# Patient Record
Sex: FEMALE | Race: WHITE | NOT HISPANIC OR LATINO | Employment: STUDENT | URBAN - METROPOLITAN AREA
[De-identification: names, ages, dates, MRNs, and addresses within clinical notes are randomized per-mention and may not be internally consistent; named-entity substitution may affect disease eponyms.]

---

## 2023-05-02 ENCOUNTER — HOSPITAL ENCOUNTER (EMERGENCY)
Facility: CLINIC | Age: 23
Discharge: HOME | End: 2023-05-03
Attending: EMERGENCY MEDICINE | Admitting: EMERGENCY MEDICINE
Payer: COMMERCIAL

## 2023-05-02 ENCOUNTER — APPOINTMENT (OUTPATIENT)
Dept: GENERAL RADIOLOGY | Facility: CLINIC | Age: 23
End: 2023-05-02
Attending: EMERGENCY MEDICINE
Payer: COMMERCIAL

## 2023-05-02 ENCOUNTER — NURSE TRIAGE (OUTPATIENT)
Dept: NURSING | Facility: CLINIC | Age: 23
End: 2023-05-02

## 2023-05-02 DIAGNOSIS — R07.9 CHEST PAIN, UNSPECIFIED TYPE: ICD-10-CM

## 2023-05-02 LAB
ANION GAP SERPL CALCULATED.3IONS-SCNC: 14 MMOL/L (ref 7–15)
ATRIAL RATE - MUSE: 65 BPM
BASOPHILS # BLD AUTO: 0.1 10E3/UL (ref 0–0.2)
BASOPHILS NFR BLD AUTO: 1 %
BUN SERPL-MCNC: 17.8 MG/DL (ref 6–20)
CALCIUM SERPL-MCNC: 10 MG/DL (ref 8.6–10)
CHLORIDE SERPL-SCNC: 103 MMOL/L (ref 98–107)
CREAT SERPL-MCNC: 0.88 MG/DL (ref 0.51–0.95)
D DIMER PPP FEU-MCNC: <0.27 UG/ML FEU (ref 0–0.5)
DEPRECATED HCO3 PLAS-SCNC: 23 MMOL/L (ref 22–29)
DIASTOLIC BLOOD PRESSURE - MUSE: NORMAL MMHG
EOSINOPHIL # BLD AUTO: 0.1 10E3/UL (ref 0–0.7)
EOSINOPHIL NFR BLD AUTO: 0 %
ERYTHROCYTE [DISTWIDTH] IN BLOOD BY AUTOMATED COUNT: 13.6 % (ref 10–15)
GFR SERPL CREATININE-BSD FRML MDRD: >90 ML/MIN/1.73M2
GLUCOSE SERPL-MCNC: 100 MG/DL (ref 70–99)
HCG SERPL QL: NEGATIVE
HCT VFR BLD AUTO: 39.8 % (ref 35–47)
HGB BLD-MCNC: 13.2 G/DL (ref 11.7–15.7)
IMM GRANULOCYTES # BLD: 0.1 10E3/UL
IMM GRANULOCYTES NFR BLD: 0 %
INTERPRETATION ECG - MUSE: NORMAL
LYMPHOCYTES # BLD AUTO: 4.6 10E3/UL (ref 0.8–5.3)
LYMPHOCYTES NFR BLD AUTO: 34 %
MAGNESIUM SERPL-MCNC: 2 MG/DL (ref 1.7–2.3)
MCH RBC QN AUTO: 27.8 PG (ref 26.5–33)
MCHC RBC AUTO-ENTMCNC: 33.2 G/DL (ref 31.5–36.5)
MCV RBC AUTO: 84 FL (ref 78–100)
MONOCYTES # BLD AUTO: 1 10E3/UL (ref 0–1.3)
MONOCYTES NFR BLD AUTO: 7 %
NEUTROPHILS # BLD AUTO: 7.6 10E3/UL (ref 1.6–8.3)
NEUTROPHILS NFR BLD AUTO: 58 %
NRBC # BLD AUTO: 0 10E3/UL
NRBC BLD AUTO-RTO: 0 /100
P AXIS - MUSE: 51 DEGREES
PLATELET # BLD AUTO: 272 10E3/UL (ref 150–450)
POTASSIUM SERPL-SCNC: 3.7 MMOL/L (ref 3.4–5.3)
PR INTERVAL - MUSE: 112 MS
QRS DURATION - MUSE: 88 MS
QT - MUSE: 416 MS
QTC - MUSE: 432 MS
R AXIS - MUSE: 78 DEGREES
RBC # BLD AUTO: 4.75 10E6/UL (ref 3.8–5.2)
SODIUM SERPL-SCNC: 140 MMOL/L (ref 136–145)
SYSTOLIC BLOOD PRESSURE - MUSE: NORMAL MMHG
T AXIS - MUSE: 21 DEGREES
TROPONIN T SERPL HS-MCNC: <6 NG/L
VENTRICULAR RATE- MUSE: 65 BPM
WBC # BLD AUTO: 13.4 10E3/UL (ref 4–11)

## 2023-05-02 PROCEDURE — 99285 EMERGENCY DEPT VISIT HI MDM: CPT | Mod: 25 | Performed by: EMERGENCY MEDICINE

## 2023-05-02 PROCEDURE — 85379 FIBRIN DEGRADATION QUANT: CPT | Performed by: EMERGENCY MEDICINE

## 2023-05-02 PROCEDURE — 85004 AUTOMATED DIFF WBC COUNT: CPT | Performed by: EMERGENCY MEDICINE

## 2023-05-02 PROCEDURE — 84484 ASSAY OF TROPONIN QUANT: CPT | Performed by: EMERGENCY MEDICINE

## 2023-05-02 PROCEDURE — 71046 X-RAY EXAM CHEST 2 VIEWS: CPT | Mod: 26 | Performed by: RADIOLOGY

## 2023-05-02 PROCEDURE — 84703 CHORIONIC GONADOTROPIN ASSAY: CPT | Performed by: EMERGENCY MEDICINE

## 2023-05-02 PROCEDURE — 250N000013 HC RX MED GY IP 250 OP 250 PS 637: Performed by: EMERGENCY MEDICINE

## 2023-05-02 PROCEDURE — 71046 X-RAY EXAM CHEST 2 VIEWS: CPT

## 2023-05-02 PROCEDURE — 36415 COLL VENOUS BLD VENIPUNCTURE: CPT | Performed by: EMERGENCY MEDICINE

## 2023-05-02 PROCEDURE — 93010 ELECTROCARDIOGRAM REPORT: CPT | Mod: 59 | Performed by: EMERGENCY MEDICINE

## 2023-05-02 PROCEDURE — 93005 ELECTROCARDIOGRAM TRACING: CPT | Performed by: EMERGENCY MEDICINE

## 2023-05-02 PROCEDURE — 80048 BASIC METABOLIC PNL TOTAL CA: CPT | Performed by: EMERGENCY MEDICINE

## 2023-05-02 PROCEDURE — 83735 ASSAY OF MAGNESIUM: CPT | Performed by: EMERGENCY MEDICINE

## 2023-05-02 PROCEDURE — 93308 TTE F-UP OR LMTD: CPT | Mod: 26 | Performed by: EMERGENCY MEDICINE

## 2023-05-02 PROCEDURE — 93308 TTE F-UP OR LMTD: CPT | Performed by: EMERGENCY MEDICINE

## 2023-05-02 RX ORDER — ACETAMINOPHEN 325 MG/1
975 TABLET ORAL ONCE
Status: COMPLETED | OUTPATIENT
Start: 2023-05-02 | End: 2023-05-02

## 2023-05-02 RX ADMIN — ACETAMINOPHEN 975 MG: 325 TABLET, FILM COATED ORAL at 22:50

## 2023-05-02 ASSESSMENT — ACTIVITIES OF DAILY LIVING (ADL): ADLS_ACUITY_SCORE: 35

## 2023-05-03 VITALS
HEART RATE: 67 BPM | RESPIRATION RATE: 16 BRPM | SYSTOLIC BLOOD PRESSURE: 105 MMHG | WEIGHT: 150 LBS | HEIGHT: 70 IN | TEMPERATURE: 98.2 F | DIASTOLIC BLOOD PRESSURE: 67 MMHG | OXYGEN SATURATION: 99 % | BODY MASS INDEX: 21.47 KG/M2

## 2023-05-03 NOTE — ED TRIAGE NOTES
22 yr old female arrives via walk in w/ complaints of left sided chest pain that worsens with exertion x 1 day. Pmhx of an irregular heartbeat. Pt did run a half marathon this weekend as well. Pt denies that the pain radiates.

## 2023-05-03 NOTE — ED PROVIDER NOTES
"    [place] EMERGENCY DEPARTMENT ([facility])    5/02/23       ED PROVIDER NOTE      History     Chief Complaint   Patient presents with     Chest Pain     HPI  Natasha Hernandez is a 22 year old female with a past medical history significant for major depressive disorder and generalized anxiety disorder (not on current medication) who presents to the ED for evaluation of left-sided chest pain.  Patient states that earlier today around noon, she was walking around campus when she began to experience a metallic taste in her mouth, left-sided chest pain, and dizziness.  She states that she feels easily exerted today.  Patient reports that her symptoms have been coming and going.  Patient did run a half marathon on Sunday 4/30, but states that she felt typical afterwards.  She denies cough, shortness of breath, pain with deep breathing, or pain with position changes.  No nausea, vomiting, fever, or diarrhea.  No pain or swelling in lower extremities, history of blood clots, or family history of blood clots.  Patient denies use of birth control pill or implant, smoking, or vaping.  Denies abdominal pain.  No changes in bowel or urinary habits.  Denies other past medical history.    Past Medical History  No past medical history on file.  No past surgical history on file.  No current outpatient medications on file.    No Known Allergies  Family History  No family history on file.  Social History       Past medical history, past surgical history, medications, allergies, family history, and social history were reviewed with the patient. No additional pertinent items.        Physical Exam   BP: (!) 151/85  Pulse: 62  Temp: 98.2  F (36.8  C)  Resp: 16  Height: 177.8 cm (5' 10\")  Weight: 68 kg (150 lb)  SpO2: 97 %  Physical Exam  General: patient is alert and oriented and in no acute distress   Head: atraumatic and normocephalic   EENT: moist mucus membranes without tonsillar erythema or exudates, sclera anicteric   neck: " supple   Cardiovascular: regular rate and rhythm, no murmur appreciated, extremities warm and well perfused, no lower extremity edema, 2+ radial and PT pulses bilaterally  Pulmonary: lungs clear to auscultation bilaterally   Abdomen: soft, non-tender   Musculoskeletal: normal range of motion   Neurological: alert and oriented, moving all extremities symmetrically, gait normal   Skin: warm, dry       ED Course, Procedures, & Data     10:15 PM  The patient was seen and examined by Brea Anton   in Room ED18.     Procedures       ED Course Selections:        EKG Interpretation:      Interpreted by Brea Anton MD  Time reviewed: 2211  Symptoms at time of EKG: None   Rhythm: normal sinus   Rate: Normal  Axis: Normal  Ectopy: none  Conduction: normal  ST Segments/ T Waves: T wave inversion III  Q Waves: none  Comparison to prior: No old EKG available    Clinical Impression: non-specific EKG, question possible delta wave in inferior leads and WPW              No results found for any visits on 05/02/23.  Medications - No data to display  Labs Ordered and Resulted from Time of ED Arrival to Time of ED Departure - No data to display  XR Chest 2 Views    (Results Pending)          Critical care was not performed.     Medical Decision Making  The patient's presentation was of high complexity (an acute health issue posing potential threat to life or bodily function).    The patient's evaluation involved:  ordering and/or review of 3+ test(s) in this encounter (see separate area of note for details)  independent interpretation of testing performed by another health professional (see separate area of note for details)    The patient's management necessitated high risk (a decision regarding hospitalization).      Assessment & Plan    Ms. Hernandez is a 22 year old female with a past medical history significant for major depressive disorder and generalized anxiety disorder (not on current medication) who presents to  the ED for evaluation of left-sided chest pain.  She is initially noted to be moderately hypertensive her upper and recheck blood pressure is within normal limits.  She is otherwise hemodynamically stable, afebrile and in no respiratory distress.  Her ECG shows no acute ischemic changes.  Troponin is less than 6.  Low suspicion for ACS. Reviewed EKG with cardiology for possible delta wave.  Recommended a follow-up in cardiology clinic for further evaluation.  Chest x-ray shows no evidence of pneumonia, pneumothorax, pleural effusion or widening of the mediastinum.  Low suspicion for PE and D-dimer is within normal limits.  Also have a low suspicion for aortic dissection given history, normal chest x-ray and D-dimer.  Bedside cardiac ultrasound shows grossly normal function without pericardial effusion.  She has no abdominal tenderness on exam.  Discussed results with patient and recommended that she follow-up with cardiology.  She was given close return precautions for the emergency department and voiced understanding.    I have reviewed the nursing notes. I have reviewed the findings, diagnosis, plan and need for follow up with the patient.    There are no discharge medications for this patient.      Final diagnoses:   Chest pain, unspecified type   I, Kellen Blanchard, am serving as a trained medical scribe to document services personally performed by Brea Mendieta MD, based on the provider's statements to me.     I, Brea Mendieta MD, was physically present and have reviewed and verified the accuracy of this note documented by Kellen Blanchard.      Brea Mendieta MD  [facility] EMERGENCY DEPARTMENT  5/2/2023          Brea Mendieta MD  05/03/23 0143

## 2023-05-03 NOTE — TELEPHONE ENCOUNTER
"Natasha reports she's been having \"weird Chest Pain\" of her Lt chest.  She had symptoms this morning and again tonight.    She describes it's more like a muscle cramp than pain - \"like my Pecs\"  - Currently feeling since ~8 pm, (>1h)  - Ran 1/2 marathon on Sun, 4/30/23    This morning  - Started with metallic taste - this morning ~10am  - Then felt lightheaded  - Then Lt chest pain when walking on campus    Tonight when walking on campus, she felt like it was harder to breathe  While at work tonight, she felt very fatigued and her arms felt weak.    She was told she had an Irregular Heart beat a few years ago when donating plasma.  It was not identified and she has not been seen or treated for it.    ER advised    Sharla Wheeler RN  [facility]      Reason for Disposition    [1] Chest pain lasts > 5 minutes AND [2] occurred in past 3 days (72 hours) (Exception: feels exactly the same as previously diagnosed heartburn and has accompanying sour taste in mouth)    Additional Information    Negative: SEVERE difficulty breathing (e.g., struggling for each breath, speaks in single words)    Negative: Difficult to awaken or acting confused (e.g., disoriented, slurred speech)    Negative: Shock suspected (e.g., cold/pale/clammy skin, too weak to stand, low BP, rapid pulse)    Negative: Passed out (i.e., lost consciousness, collapsed and was not responding)    Negative: [1] Chest pain lasts > 5 minutes AND [2] age > 44    Negative: [1] Chest pain lasts > 5 minutes AND [2] age > 30 AND [3] one or more cardiac risk factors (e.g., diabetes, high blood pressure, high cholesterol, smoker, or strong family history of heart disease)    Negative: [1] Chest pain lasts > 5 minutes AND [2] history of heart disease (i.e., angina, heart attack, heart failure, bypass surgery, takes nitroglycerin)    Negative: [1] Chest pain lasts > 5 minutes AND [2] described as crushing, pressure-like, or heavy    Negative: Heart " "beating < 50 beats per minute OR > 140 beats per minute    Negative: Visible sweat on face or sweat dripping down face    Negative: Sounds like a life-threatening emergency to the triager    Negative: Followed a chest injury    Negative: SEVERE chest pain    Negative: [1] Chest pain (or \"angina\") comes and goes AND [2] is happening more often (increasing in frequency) or getting worse (increasing in severity) (Exception: chest pains that last only a few seconds)    Negative: Pain also in shoulder(s) or arm(s) or jaw (Exception: pain is clearly made worse by movement)    Negative: Difficulty breathing    Negative: Dizziness or lightheadedness    Negative: Coughing up blood    Negative: Cocaine use within last 3 days    Negative: Major surgery in past month    Negative: Hip or leg fracture (broken bone) in past month (or had cast on leg or ankle in past month)    Negative: Illness requiring prolonged bedrest in past month (e.g., immobilization, long hospital stay)    Negative: Long-distance travel in past month (e.g., car, bus, train, plane; with trip lasting 6 or more hours)    Negative: History of prior \"blood clot\" in leg or lungs (i.e., deep vein thrombosis, pulmonary embolism)    Negative: History of inherited increased risk of blood clots (e.g., Factor 5 Leiden, Anti-thrombin 3, Protein C or Protein S deficiency, Prothrombin mutation)    Negative: Cancer treatment in past six months (or has cancer now)    Protocols used: CHEST PAIN-A-AH      "

## 2023-05-04 NOTE — TELEPHONE ENCOUNTER
RECORDS RECEIVED FROM:    DATE RECEIVED:    NOTES STATUS DETAILS   OFFICE NOTE from referring provider  Internal ED f/u   OFFICE NOTE from other cardiologists  N/A    RECORDS from hospital/ED Internal 5-2-23   MEDICATION LIST Internal    GENERAL CARDIO RECORDS   (ALL APPOINTMENT TYPES)     LABS (CBC,BMP,CMP, TSH) Internal    EKG (STRIPS & REPORTS) Internal 5-2-23   MONITORS (STRIPS & REPORTS) N/A    ECHOS (IMAGES AND REPORTS) N/A    STRESS TESTS (IMAGES AND REPORTS) N/A    cMRI (IMAGES AND REPORTS) N/A    CT/CTA (IMAGES AND REPORTS) N/A

## 2023-05-05 ENCOUNTER — APPOINTMENT (OUTPATIENT)
Dept: CARDIOLOGY | Facility: CLINIC | Age: 23
End: 2023-05-05
Attending: EMERGENCY MEDICINE
Payer: COMMERCIAL

## 2023-05-05 ENCOUNTER — HOSPITAL ENCOUNTER (EMERGENCY)
Facility: CLINIC | Age: 23
Discharge: HOME | End: 2023-05-05
Attending: EMERGENCY MEDICINE | Admitting: EMERGENCY MEDICINE
Payer: COMMERCIAL

## 2023-05-05 VITALS
OXYGEN SATURATION: 100 % | BODY MASS INDEX: 21.47 KG/M2 | SYSTOLIC BLOOD PRESSURE: 114 MMHG | HEART RATE: 56 BPM | WEIGHT: 150 LBS | TEMPERATURE: 98.5 F | RESPIRATION RATE: 18 BRPM | HEIGHT: 70 IN | DIASTOLIC BLOOD PRESSURE: 75 MMHG

## 2023-05-05 DIAGNOSIS — R07.89 CHEST DISCOMFORT: ICD-10-CM

## 2023-05-05 DIAGNOSIS — R00.2 PALPITATIONS: ICD-10-CM

## 2023-05-05 LAB
ANION GAP SERPL CALCULATED.3IONS-SCNC: 9 MMOL/L (ref 7–15)
ATRIAL RATE - MUSE: 48 BPM
BASOPHILS # BLD AUTO: 0.1 10E3/UL (ref 0–0.2)
BASOPHILS NFR BLD AUTO: 1 %
BUN SERPL-MCNC: 20 MG/DL (ref 6–20)
CALCIUM SERPL-MCNC: 9.5 MG/DL (ref 8.6–10)
CHLORIDE SERPL-SCNC: 106 MMOL/L (ref 98–107)
CREAT SERPL-MCNC: 0.73 MG/DL (ref 0.51–0.95)
DEPRECATED HCO3 PLAS-SCNC: 23 MMOL/L (ref 22–29)
DIASTOLIC BLOOD PRESSURE - MUSE: NORMAL MMHG
EOSINOPHIL # BLD AUTO: 0.1 10E3/UL (ref 0–0.7)
EOSINOPHIL NFR BLD AUTO: 1 %
ERYTHROCYTE [DISTWIDTH] IN BLOOD BY AUTOMATED COUNT: 13.5 % (ref 10–15)
GFR SERPL CREATININE-BSD FRML MDRD: >90 ML/MIN/1.73M2
GLUCOSE SERPL-MCNC: 92 MG/DL (ref 70–99)
HCT VFR BLD AUTO: 40.8 % (ref 35–47)
HGB BLD-MCNC: 13.2 G/DL (ref 11.7–15.7)
IMM GRANULOCYTES # BLD: 0 10E3/UL
IMM GRANULOCYTES NFR BLD: 0 %
INTERPRETATION ECG - MUSE: NORMAL
LYMPHOCYTES # BLD AUTO: 4.7 10E3/UL (ref 0.8–5.3)
LYMPHOCYTES NFR BLD AUTO: 39 %
MAGNESIUM SERPL-MCNC: 2 MG/DL (ref 1.7–2.3)
MCH RBC QN AUTO: 27.7 PG (ref 26.5–33)
MCHC RBC AUTO-ENTMCNC: 32.4 G/DL (ref 31.5–36.5)
MCV RBC AUTO: 86 FL (ref 78–100)
MONOCYTES # BLD AUTO: 0.9 10E3/UL (ref 0–1.3)
MONOCYTES NFR BLD AUTO: 8 %
NEUTROPHILS # BLD AUTO: 6.3 10E3/UL (ref 1.6–8.3)
NEUTROPHILS NFR BLD AUTO: 51 %
NRBC # BLD AUTO: 0 10E3/UL
NRBC BLD AUTO-RTO: 0 /100
P AXIS - MUSE: 19 DEGREES
PLATELET # BLD AUTO: 261 10E3/UL (ref 150–450)
POTASSIUM SERPL-SCNC: 3.7 MMOL/L (ref 3.4–5.3)
PR INTERVAL - MUSE: 134 MS
QRS DURATION - MUSE: 98 MS
QT - MUSE: 434 MS
QTC - MUSE: 387 MS
R AXIS - MUSE: 74 DEGREES
RBC # BLD AUTO: 4.77 10E6/UL (ref 3.8–5.2)
SODIUM SERPL-SCNC: 138 MMOL/L (ref 136–145)
SYSTOLIC BLOOD PRESSURE - MUSE: NORMAL MMHG
T AXIS - MUSE: 7 DEGREES
TROPONIN T SERPL HS-MCNC: <6 NG/L
VENTRICULAR RATE- MUSE: 48 BPM
WBC # BLD AUTO: 12.1 10E3/UL (ref 4–11)

## 2023-05-05 PROCEDURE — 99284 EMERGENCY DEPT VISIT MOD MDM: CPT | Mod: 25 | Performed by: EMERGENCY MEDICINE

## 2023-05-05 PROCEDURE — 80048 BASIC METABOLIC PNL TOTAL CA: CPT | Performed by: EMERGENCY MEDICINE

## 2023-05-05 PROCEDURE — 36415 COLL VENOUS BLD VENIPUNCTURE: CPT | Performed by: EMERGENCY MEDICINE

## 2023-05-05 PROCEDURE — 93244 EXT ECG>48HR<7D REV&INTERPJ: CPT | Performed by: INTERNAL MEDICINE

## 2023-05-05 PROCEDURE — 85025 COMPLETE CBC W/AUTO DIFF WBC: CPT | Performed by: EMERGENCY MEDICINE

## 2023-05-05 PROCEDURE — 83735 ASSAY OF MAGNESIUM: CPT | Performed by: EMERGENCY MEDICINE

## 2023-05-05 PROCEDURE — 93010 ELECTROCARDIOGRAM REPORT: CPT | Performed by: EMERGENCY MEDICINE

## 2023-05-05 PROCEDURE — 93005 ELECTROCARDIOGRAM TRACING: CPT | Performed by: EMERGENCY MEDICINE

## 2023-05-05 PROCEDURE — 99284 EMERGENCY DEPT VISIT MOD MDM: CPT | Performed by: EMERGENCY MEDICINE

## 2023-05-05 PROCEDURE — 84484 ASSAY OF TROPONIN QUANT: CPT | Performed by: EMERGENCY MEDICINE

## 2023-05-05 PROCEDURE — 93242 EXT ECG>48HR<7D RECORDING: CPT

## 2023-05-05 ASSESSMENT — ACTIVITIES OF DAILY LIVING (ADL): ADLS_ACUITY_SCORE: 35

## 2023-05-05 NOTE — DISCHARGE INSTRUCTIONS
Follow up later today, as planned. Return the ziopatch as directed. Return with any worsening or concerns.

## 2023-05-05 NOTE — ED PROVIDER NOTES
"      [place] EMERGENCY DEPARTMENT ([facility])  May 5, 2023     History     Chief Complaint   Patient presents with     Palpitations     HPI  Natasha Hernandez is a 22 year old female with a past medical history which includes anxiety and depression who presents to the Emergency Department for evaluation of left-sided chest pressure and palpitations.  She was seen here a few days ago, states she has had left-sided chest pressure most of the time since that visit.  She states that at times she feels short of breath as well, and at times she is felt palpitations-which she describes as, \"my heart beating out of my chest.\"  No fever, cough, abdominal pain, nausea, vomiting, diarrhea, diaphoresis.  She denies any previous history of DVT or PE, any recent travel, surgery, bedrest.  No lower extremity pain or swelling.  She was seen here on 5/2/23, and had a negative evaluation with the exception of an EKG with a questionable delta wave.  This was discussed with cardiology while in the ED, they recommended outpatient follow-up with cardiology clinic.  She reports that she has gotten visit with primary care tomorrow, and believes that she will be able to schedule for echo after that.  Per chart review, it appears that she is given a cardiology appointment scheduled for 5/18/2023.  She denies any substance use including alcohol, drug use.  She denies any stimulant usage recently such as Sudafed.  She states that she drinks less than 1 cup of coffee a day.  She does admit to some anxiety now that her heart is racing, but states that she did not feel that she was having anxiety when it started to race.            Past Medical History  No past medical history on file.  No past surgical history on file.  No current outpatient medications on file.    No Known Allergies  Family History  No family history on file.  Social History       Past medical history, past surgical history, medications, allergies, family history, and " "social history were reviewed with the patient. No additional pertinent items.      A medically appropriate review of systems was performed with pertinent positives and negatives noted in the HPI, and all other systems negative.    Physical Exam   BP: (!) 142/87  Pulse: 60  Temp: 98.5  F (36.9  C)  Resp: 17  Height: 177.8 cm (5' 10\")  Weight: 68 kg (150 lb)  SpO2: 100 %  Physical Exam  Constitutional:       General: She is not in acute distress.     Appearance: Normal appearance. She is not toxic-appearing.   HENT:      Head: Atraumatic.   Eyes:      General: No scleral icterus.     Conjunctiva/sclera: Conjunctivae normal.   Cardiovascular:      Rate and Rhythm: Normal rate.      Heart sounds: Normal heart sounds.   Pulmonary:      Effort: No respiratory distress.      Breath sounds: Normal breath sounds.   Abdominal:      Palpations: Abdomen is soft.      Tenderness: There is no abdominal tenderness.   Musculoskeletal:         General: No deformity.      Cervical back: Neck supple.   Skin:     General: Skin is warm.      Findings: No rash.   Neurological:      Mental Status: She is alert.           ED Course, Procedures, & Data      Procedures       ED Course Selections:        EKG Interpretation:      Interpreted by Cindy Pope MD  Time reviewed: 0246  Symptoms at time of EKG: chest pressure   Rhythm: sinus bradycardia  Rate: 48  Axis: Normal  Ectopy: none  Conduction: RSR' in V1 suggests right ventricular conduction delay  ST Segments/ T Waves: No ST-T wave changes  Q Waves: none  Comparison to prior: Unchanged    Clinical Impression: sinus bradycardia, otherwise unchanged                           Results for orders placed or performed during the hospital encounter of 05/05/23   Basic metabolic panel     Status: Normal   Result Value Ref Range    Sodium 138 136 - 145 mmol/L    Potassium 3.7 3.4 - 5.3 mmol/L    Chloride 106 98 - 107 mmol/L    Carbon Dioxide (CO2) 23 22 - 29 mmol/L    Anion Gap 9 7 - 15 " mmol/L    Urea Nitrogen 20.0 6.0 - 20.0 mg/dL    Creatinine 0.73 0.51 - 0.95 mg/dL    Calcium 9.5 8.6 - 10.0 mg/dL    Glucose 92 70 - 99 mg/dL    GFR Estimate >90 >60 mL/min/1.73m2   Magnesium     Status: Normal   Result Value Ref Range    Magnesium 2.0 1.7 - 2.3 mg/dL   Troponin T, High Sensitivity     Status: Normal   Result Value Ref Range    Troponin T, High Sensitivity <6 <=14 ng/L   CBC with platelets and differential     Status: Abnormal   Result Value Ref Range    WBC Count 12.1 (H) 4.0 - 11.0 10e3/uL    RBC Count 4.77 3.80 - 5.20 10e6/uL    Hemoglobin 13.2 11.7 - 15.7 g/dL    Hematocrit 40.8 35.0 - 47.0 %    MCV 86 78 - 100 fL    MCH 27.7 26.5 - 33.0 pg    MCHC 32.4 31.5 - 36.5 g/dL    RDW 13.5 10.0 - 15.0 %    Platelet Count 261 150 - 450 10e3/uL    % Neutrophils 51 %    % Lymphocytes 39 %    % Monocytes 8 %    % Eosinophils 1 %    % Basophils 1 %    % Immature Granulocytes 0 %    NRBCs per 100 WBC 0 <1 /100    Absolute Neutrophils 6.3 1.6 - 8.3 10e3/uL    Absolute Lymphocytes 4.7 0.8 - 5.3 10e3/uL    Absolute Monocytes 0.9 0.0 - 1.3 10e3/uL    Absolute Eosinophils 0.1 0.0 - 0.7 10e3/uL    Absolute Basophils 0.1 0.0 - 0.2 10e3/uL    Absolute Immature Granulocytes 0.0 <=0.4 10e3/uL    Absolute NRBCs 0.0 10e3/uL   CBC with platelets differential     Status: Abnormal    Narrative    The following orders were created for panel order CBC with platelets differential.  Procedure                               Abnormality         Status                     ---------                               -----------         ------                     CBC with platelets and d...[[id number]]  Abnormal            Final result                 Please view results for these tests on the individual orders.     Medications - No data to display  Labs Ordered and Resulted from Time of ED Arrival to Time of ED Departure   CBC WITH PLATELETS AND DIFFERENTIAL - Abnormal       Result Value    WBC Count 12.1 (*)     RBC Count 4.77       Hemoglobin 13.2      Hematocrit 40.8      MCV 86      MCH 27.7      MCHC 32.4      RDW 13.5      Platelet Count 261      % Neutrophils 51      % Lymphocytes 39      % Monocytes 8      % Eosinophils 1      % Basophils 1      % Immature Granulocytes 0      NRBCs per 100 WBC 0      Absolute Neutrophils 6.3      Absolute Lymphocytes 4.7      Absolute Monocytes 0.9      Absolute Eosinophils 0.1      Absolute Basophils 0.1      Absolute Immature Granulocytes 0.0      Absolute NRBCs 0.0     BASIC METABOLIC PANEL - Normal    Sodium 138      Potassium 3.7      Chloride 106      Carbon Dioxide (CO2) 23      Anion Gap 9      Urea Nitrogen 20.0      Creatinine 0.73      Calcium 9.5      Glucose 92      GFR Estimate >90     MAGNESIUM - Normal    Magnesium 2.0     TROPONIN T, HIGH SENSITIVITY - Normal    Troponin T, High Sensitivity <6       Adult Leadless EKG Monitor 3 to 7 Days    (Results Pending)          Critical care was not performed.     Medical Decision Making  The patient's presentation was of moderate complexity (an acute illness with systemic symptoms).    The patient's evaluation involved:  review of external note(s) from 1 sources (previous note)  ordering and/or review of 3+ test(s) in this encounter (see separate area of note for details)  review of 3+ test result(s) ordered prior to this encounter (see separate area of note for details)    The patient's management necessitated only low risk treatment.      Assessment & Plan    I did review the patient's most recent visit.  It looks like that EKG was reviewed by cardiology, they recommended follow-up as an outpatient.  EKG today looks very similar, though she is slightly bradycardic today.  Troponin was negative.  I have low suspicion for acute coronary syndrome, as the troponin was undetectable.  In addition, D-dimer was undetectable on her last visit, I do not think it needs to be repeated today given that this is the same episode of chest pain.  Chest x-ray was  unremarkable on the last visit as well.  The cause of her symptoms is not clear.  No evidence to suggest an arrhythmia at this point.  Given the undetectable D-dimer, dissection is unlikely as well.  We were able to place a Zio patch today.  She does have an appoint with her clinic doctor in the morning, and has plans to schedule an echo as well.  I do encourage her to follow-up with this plan.  She also has an appointment scheduled cardiology.  She is encouraged to keep this appointment.  I have low suspicion for serious cause for her chest pain at this time, however, she is encouraged to return with any new or worsening symptoms, any other concerns.  She verbalizes understanding and is agreeable to the plan.    Dictation Disclaimer: Some of this Note has been completed with voice-recognition dictation software. Although errors are generally corrected real-time, there is the potential for a rare error to be present in the completed chart.      I have reviewed the nursing notes. I have reviewed the findings, diagnosis, plan and need for follow up with the patient.    There are no discharge medications for this patient.      Final diagnoses:   Palpitations   Chest discomfort         [facility] EMERGENCY DEPARTMENT  5/5/2023     Cindy Pope MD  05/05/23 5412

## 2023-05-18 ENCOUNTER — PRE VISIT (OUTPATIENT)
Dept: CARDIOLOGY | Facility: CLINIC | Age: 23
End: 2023-05-18
Payer: COMMERCIAL

## 2023-05-21 ENCOUNTER — HEALTH MAINTENANCE LETTER (OUTPATIENT)
Age: 23
End: 2023-05-21

## 2023-10-13 ENCOUNTER — NURSE TRIAGE (OUTPATIENT)
Dept: NURSING | Facility: CLINIC | Age: 23
End: 2023-10-13
Payer: COMMERCIAL

## 2023-10-14 NOTE — TELEPHONE ENCOUNTER
Nurse Triage SBAR    Is this a 2nd Level Triage? NO    Situation: Foreign body in vagina, now removed.     Background: Recent intercourse (last weekend); condom came off, was found inside of her today. Condom was left in for a week. She is not on any birth control; partner says he did not finish, but she is concerned about both infection and pregnancy. Last period was approximately 2 weeks ago, Next period in about a week and a half.     Assessment: Some recent mild intermittent abdominal cramping this morning rated at 3 on a scale of 0-10, no pain currently, itching, smell, afebrile    Protocol Recommended Disposition:   See PCP Within 24 Hours    Recommendation: Patient should be seen by HCP in next 24 hours; patient states she will look for the nearest urgent care. Encouraged to call back with any new or worsening symptoms including fever. Patient agrees.      Marissa Shaikh RN on 10/13/2023 at 11:23 PM      Reason for Disposition   [1] Vaginal FB removed AND [2] unusual vaginal discharge or bad odor occurs    Additional Information   Negative: Shock suspected (e.g., cold/pale/clammy skin, too weak to stand, low BP, rapid pulse)   Negative: Sounds like a life-threatening emergency to the triager   Negative: Vaginal pessary, questions about   Negative: FB is sharp   Negative: Bleeding from the vagina  [Exception: Patient denies injury and knows that the blood is from menstrual period.]   Negative: FB causes pain or discomfort   Negative: Unable to urinate (can't pass urine)   Negative: Possibility of sexual assault   Negative: [1] Retained tampon AND [2] fever   Negative: [1] Retained tampon AND [2] new rash   Negative: Patient sounds very sick or weak to the triager   Negative: [1] Vaginal FB AND [2] can't remove at home    Protocols used: Vaginal Foreign Body-A-AH

## 2024-07-28 ENCOUNTER — HEALTH MAINTENANCE LETTER (OUTPATIENT)
Age: 24
End: 2024-07-28

## 2025-08-10 ENCOUNTER — HEALTH MAINTENANCE LETTER (OUTPATIENT)
Age: 25
End: 2025-08-10